# Patient Record
Sex: MALE | Race: WHITE | NOT HISPANIC OR LATINO | Employment: FULL TIME | URBAN - METROPOLITAN AREA
[De-identification: names, ages, dates, MRNs, and addresses within clinical notes are randomized per-mention and may not be internally consistent; named-entity substitution may affect disease eponyms.]

---

## 2018-01-10 NOTE — RESULT NOTES
Message   Please inform patient that the biopsies from his stomach did not show any evidence of H  pylori infection  The small polyps removed from his colon were hyperplastic polyps  His next colonoscopy should be in 10 years for screening  He had a small ulcer which showed some chronic inflammation  However this was not typical of any specific colitis  If he is taking any NSAIDs he should avoid these medications  I would like to see him back in the office in 6 months time to follow-up  He should be taking Zantac twice daily as well  Verified Results  (1) TISSUE EXAM 22Mar2016 10:07AM Nevelyn Holstein     Test Name Result Flag Reference   LAB AP CASE REPORT (Report)     Surgical Pathology Report             Case: [id number]                   Authorizing Provider: Rui Anaya MD      Collected:      03/22/2016 1007        Ordering Location:   [facility]    Received:      03/22/2016 [address] [place] [address] Endoscopy                               Pathologist:      Gregoria Toribio MD                               Specimens:  A) - Stomach, Stomach body bx r/o H pylori                               B) - Large Intestine, Right/Ascending Colon, Ascending colon ulcer bx cold forcept           C) - Large Intestine, Left/Descending Colon, Polyp descending colon cold forcept            D) - Large Intestine, Sigmoid Colon, Sigmoid polyp, cold forcept   LAB AP FINAL DIAGNOSIS (Report)     A  Stomach body biopsy:  - Chronic inactive antral gastritis, negative for curvilinear Helicobacter   pylori, confirmed by immunohistochemical stains, evaluated with   appropriate positive & negative controls  - No atrophy or intestinal metaplasia identified   - No epithelial dysplasia and no evidence of malignancy  B  Ascending colon ulcer:  - Focal active colitis with chronic inflammation & fibrosis of subjacent   fibroadipose tissue, compatible with ulcer bed    - No epithelial dysplasia and no evidence of malignancy  C  Descending colon:  - Hyperplastic polyp   - No epithelial dysplasia and no evidence of malignancy  D  Sigmoid colon:  - Hyperplastic polyp   - No epithelial dysplasia and no evidence of malignancy  [facility]   LAB AP SURGICAL ADDITIONAL INFORMATION (Report)     These tests were developed and their performance characteristics   determined by [address] [place] [address] or [employer]  They may not be cleared or approved by the [place]  Food and   Drug Administration  The FDA has determined that such clearance or   approval is not necessary  These tests are used for clinical purposes  They should not be regarded as investigational or for research  This   laboratory has been approved by [place] [id number], designated as a high-complexity   laboratory and is qualified to perform these tests  LAB AP GROSS DESCRIPTION (Report)     A  The specimen is received in formalin, labeled with the patient's name   and hospital number, and is designated stomach body biopsy rule out H    pylori  The specimen consists of multiple tan red soft tissue fragments   measuring in aggregate 0 6 x 0 5 x 0 1 cm  Entirely submitted  One   cassette  Note: The estimated total formalin fixation time based upon information   provided by the submitting clinician and the standard processing schedule   is 18 5 hours  B  The specimen is received in formalin, labeled with the patient's name   and hospital number, and is designated ascending colon ulcer biopsy  The specimen consists of several tan soft tissue fragments measuring in   aggregate 0 7 x 0 3 x 0 1 cm  Entirely submitted  One cassette  C  The specimen is received in formalin, labeled with the patient's name   and hospital number, and is designated polyp descending colon  The   specimen consists of 3 tan soft tissue fragments each measuring 0 2-0 3   cm   Entirely submitted  One cassette  D  The specimen is received in formalin, labeled with the patient's name   and hospital number, and is designated sigmoid polyp  The specimen   consists of a single tan soft tissue fragment measuring 0 5 cm  Entirely   submitted  One cassette  Note: The estimated total formalin fixation time based upon information   provided by the submitting clinician and the standard processing schedule   is 18 0 hours  MAC         Signatures   Electronically signed by : BARBARA Richards ; Mar 29 2016  4:00PM EST                       (Author)

## 2018-01-15 NOTE — RESULT NOTES
Verified Results  COLONOSCOPY [id number] 11:02AM Paolakasey Spencertles     Test Name Result Flag Reference   Colonoscopy 03/22/16

## 2018-01-16 NOTE — RESULT NOTES
Verified Results  Ultrasound Retroperitoneal [id number] 07:36PM Traci Evans     Test Name Result Flag Reference   U/S Retroperitoneal (Report)     [address];;Vinicio;PA;[id number]   01/05/2016 1940 01/05/2016 2010       RENAL ULTRASOUND     INDICATION- Elevated creatinine     COMPARISON- None  TECHNIQUE-  Ultrasound of the retroperitoneum was performed with a   curvilinear transducer utilizing volumetric sweeps and still imaging   techniques  FINDINGS-     KIDNEYS-   Asymmetric in size, left greater than right, which may be related to a   duplicated left collecting system  Right kidney- 9 8 x 4 8 cm  Normal echogenicity and contour  No suspicious masses detected  No hydronephrosis  No shadowing calculi  No perinephric fluid collections  Left kidney- 13 5 x 5 6 cm  Normal echogenicity and contour  No suspicious masses detected  No hydronephrosis  No shadowing calculi  No perinephric fluid collections  URETERS-   Nonvisualized  BLADDER-    Normally distended  No focal thickening or mass lesions  IMPRESSION-      Asymmetric kidneys, left greater than right, 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  Transcribed on- [id number]     - HAI Triana MD   Reading Radiologist- HAI Triana MD   Electronically Signed- HAI Triana MD   Released Date Time- 01/06/16 0909   ------------------------------------------------------------------------------   [id number]^ROSS I SILVER   00384^ROSS I SILVER     (1) BASIC METABOLIC PROFILE [id number] 12:00AM Traci Evans     Test Name Result Flag Reference   GLUCOSE 102 mg/dL H 65-99   Fasting reference interval   UREA NITROGEN (BUN) 19 mg/dL  7-25   CREATININE 1 37 mg/dL H 0 70-1 33   For patients >52years of age, the reference limit  for Creatinine is approximately 13% higher for people  identified as -American  eGFR NON-AFR   AMERICAN 59 mL/min/1 73m2 L > OR = 60   eGFR AFRICAN AMERICAN 69 mL/min/1 73m2  > OR = 60   BUN/CREATININE RATIO 14 (calc)  6-22   SODIUM 140 mmol/L  135-146   POTASSIUM 4 6 mmol/L  3 5-5 3   CHLORIDE 106 mmol/L     CARBON DIOXIDE 25 mmol/L  19-30   CALCIUM 9 4 mg/dL  8 6-10 3     (Q) TESTOSTERONE, FREE AND TOTAL, LC/MS/MS [id number] 12:00AM Reji Mason     Test Name Result Flag Reference   TESTOSTERONE, TOTAL,$LC/MS/ ng/dL  250-1100   For more information on this test, go to  [email or web address]/  TotalTestosteroneLCMSMS   FREE TESTOSTERONE 61 2 pg/mL  35 0-155 0       Plan  Elevated serum creatinine    · (1) BASIC METABOLIC PROFILE; Status:Active;  Requested for:[id number];

## 2019-08-12 RX ORDER — VALACYCLOVIR HYDROCHLORIDE 500 MG/1
1 TABLET, FILM COATED ORAL 2 TIMES DAILY PRN
COMMUNITY

## 2019-08-13 ENCOUNTER — OFFICE VISIT (OUTPATIENT)
Dept: FAMILY MEDICINE CLINIC | Facility: CLINIC | Age: 54
End: 2019-08-13
Payer: COMMERCIAL

## 2019-08-13 VITALS
HEIGHT: 67 IN | DIASTOLIC BLOOD PRESSURE: 68 MMHG | BODY MASS INDEX: 39.31 KG/M2 | SYSTOLIC BLOOD PRESSURE: 102 MMHG | WEIGHT: 250.5 LBS

## 2019-08-13 DIAGNOSIS — G57.12 MERALGIA PARESTHETICA OF LEFT SIDE: Primary | ICD-10-CM

## 2019-08-13 PROCEDURE — 99214 OFFICE O/P EST MOD 30 MIN: CPT | Performed by: FAMILY MEDICINE

## 2019-08-13 PROCEDURE — 3008F BODY MASS INDEX DOCD: CPT | Performed by: FAMILY MEDICINE

## 2019-08-13 NOTE — PROGRESS NOTES
Assessment/Plan:  Meralgia paresthetica of left side  The patient has what appears to be meralgia paresthetica of his left side  We discussed the importance of weight loss to try to diminish his central obesity as this may improve his symptomatology  We also discussed the role of gabapentin if his discomfort is significantly bothersome  He is going to avoid this presently  We also noted he has not had any blood work recently  We should rule out diabetes X cetera  He will return and will get CBC CMP lipids a TSH as well as a Lyme titer  He is in agreement  Diagnoses and all orders for this visit:    Meralgia paresthetica of left side          Subjective:   Chief Complaint   Patient presents with    Numbness left outter thigh, when overuse burns        Patient ID: Patty Bell is a 53 y o  male  HPI  The patient has some numbness of his left lateral thigh that is been present for several weeks  It began possibly after a trip to play paint ball  He describes it as a burning crawling sensation over his left lateral thigh  It seems to worsen when he stands up and ambulates and seems better when he is sitting or recumbent  He has no weakness of his lower extremity  He has no incontinence of bowel or bladder  He has no other dysesthesias or other focal neurologic symptom  The following portions of the patient's history were reviewed and updated as appropriate: allergies, current medications, past family history, past medical history, past social history, past surgical history and problem list     Review of Systems   Constitution: Positive for weight loss  He has lost about 25 lb through improved dietary pattern   Cardiovascular: Negative  Respiratory: Negative  Endocrine: Negative for polydipsia, polyphagia and polyuria  Hematologic/Lymphatic: Negative for adenopathy and bleeding problem  Does not bruise/bleed easily     Musculoskeletal: Negative for back pain, falls and muscle weakness  Gastrointestinal: Negative for bowel incontinence  Genitourinary: Negative for bladder incontinence  Neurological: Positive for numbness, paresthesias and sensory change  Negative for dizziness and headaches  Psychiatric/Behavioral: Negative  Objective:    Physical Exam   Constitutional: He is oriented to person, place, and time  He appears well-developed and well-nourished  Obese in no distress   Eyes: Right eye exhibits no discharge  Left eye exhibits no discharge  No scleral icterus  Neck: No thyromegaly present  Cardiovascular: Normal rate, regular rhythm and normal heart sounds  Pulmonary/Chest: Effort normal and breath sounds normal    Abdominal: Soft  Bowel sounds are normal  He exhibits no mass  He has a protuberant abdomen   Musculoskeletal: He exhibits no edema  Lymphadenopathy:     He has no cervical adenopathy  Neurological: He is alert and oriented to person, place, and time  He has normal reflexes  He indicates left lateral thigh as the area of sensory change though he can perceive touch  Skin: No rash noted  Psychiatric: He has a normal mood and affect  Thought content normal    Nursing note and vitals reviewed        Wt Readings from Last 7 Encounters:   08/13/19 114 kg (250 lb 8 oz)   03/22/16 117 kg (258 lb 9 6 oz)   03/09/16 119 kg (263 lb)   02/26/16 120 kg (265 lb 4 oz)   02/17/16 120 kg (265 lb)   12/15/15 117 kg (258 lb)   ]

## 2019-08-13 NOTE — ASSESSMENT & PLAN NOTE
The patient has what appears to be meralgia paresthetica of his left side  We discussed the importance of weight loss to try to diminish his central obesity as this may improve his symptomatology  We also discussed the role of gabapentin if his discomfort is significantly bothersome  He is going to avoid this presently  We also noted he has not had any blood work recently  We should rule out diabetes X cetera  He will return and will get CBC CMP lipids a TSH as well as a Lyme titer  He is in agreement

## 2019-08-15 ENCOUNTER — CLINICAL SUPPORT (OUTPATIENT)
Dept: FAMILY MEDICINE CLINIC | Facility: CLINIC | Age: 54
End: 2019-08-15
Payer: COMMERCIAL

## 2019-08-15 DIAGNOSIS — Z13.220 SCREENING FOR LIPID DISORDERS: ICD-10-CM

## 2019-08-15 DIAGNOSIS — R53.83 OTHER FATIGUE: ICD-10-CM

## 2019-08-15 DIAGNOSIS — G57.12 MERALGIA PARESTHETICA OF LEFT SIDE: Primary | ICD-10-CM

## 2019-08-15 PROCEDURE — 36415 COLL VENOUS BLD VENIPUNCTURE: CPT

## 2019-08-16 LAB
ALBUMIN SERPL-MCNC: 4 G/DL (ref 3.6–5.1)
ALBUMIN/GLOB SERPL: 1.8 (CALC) (ref 1–2.5)
ALP SERPL-CCNC: 62 U/L (ref 40–115)
ALT SERPL-CCNC: 18 U/L (ref 9–46)
AST SERPL-CCNC: 15 U/L (ref 10–35)
B BURGDOR AB SER IA-ACNC: <0.9 INDEX
BILIRUB SERPL-MCNC: 0.4 MG/DL (ref 0.2–1.2)
BUN SERPL-MCNC: 23 MG/DL (ref 7–25)
BUN/CREAT SERPL: NORMAL (CALC) (ref 6–22)
CALCIUM SERPL-MCNC: 9.3 MG/DL (ref 8.6–10.3)
CHLORIDE SERPL-SCNC: 109 MMOL/L (ref 98–110)
CHOLEST SERPL-MCNC: 218 MG/DL
CHOLEST/HDLC SERPL: 5.6 (CALC)
CO2 SERPL-SCNC: 21 MMOL/L (ref 20–32)
CREAT SERPL-MCNC: 1.28 MG/DL (ref 0.7–1.33)
ERYTHROCYTE [DISTWIDTH] IN BLOOD BY AUTOMATED COUNT: 13.9 % (ref 11–15)
GLOBULIN SER CALC-MCNC: 2.2 G/DL (CALC) (ref 1.9–3.7)
GLUCOSE SERPL-MCNC: 91 MG/DL (ref 65–99)
HCT VFR BLD AUTO: 42.6 % (ref 38.5–50)
HDLC SERPL-MCNC: 39 MG/DL
HGB BLD-MCNC: 13.6 G/DL (ref 13.2–17.1)
LDLC SERPL CALC-MCNC: 138 MG/DL (CALC)
MCH RBC QN AUTO: 29.1 PG (ref 27–33)
MCHC RBC AUTO-ENTMCNC: 31.9 G/DL (ref 32–36)
MCV RBC AUTO: 91 FL (ref 80–100)
NONHDLC SERPL-MCNC: 179 MG/DL (CALC)
PLATELET # BLD AUTO: 255 THOUSAND/UL (ref 140–400)
PMV BLD REES-ECKER: 10.4 FL (ref 7.5–12.5)
POTASSIUM SERPL-SCNC: 4.8 MMOL/L (ref 3.5–5.3)
PROT SERPL-MCNC: 6.2 G/DL (ref 6.1–8.1)
RBC # BLD AUTO: 4.68 MILLION/UL (ref 4.2–5.8)
SL AMB EGFR AFRICAN AMERICAN: 73 ML/MIN/1.73M2
SL AMB EGFR NON AFRICAN AMERICAN: 63 ML/MIN/1.73M2
SODIUM SERPL-SCNC: 140 MMOL/L (ref 135–146)
TRIGL SERPL-MCNC: 265 MG/DL
TSH SERPL-ACNC: 2.11 MIU/L (ref 0.4–4.5)
WBC # BLD AUTO: 6.8 THOUSAND/UL (ref 3.8–10.8)

## 2023-03-06 ENCOUNTER — NEW PATIENT COMPREHENSIVE (OUTPATIENT)
Dept: URBAN - METROPOLITAN AREA CLINIC 6 | Facility: CLINIC | Age: 58
End: 2023-03-06

## 2023-03-06 DIAGNOSIS — H04.123: ICD-10-CM

## 2023-03-06 DIAGNOSIS — H52.4: ICD-10-CM

## 2023-03-06 PROCEDURE — 92015 DETERMINE REFRACTIVE STATE: CPT

## 2023-03-06 PROCEDURE — 92004 COMPRE OPH EXAM NEW PT 1/>: CPT

## 2023-03-06 ASSESSMENT — VISUAL ACUITY
OS_CC: 20/30+2
OD_CC: 20/30
OD_CC: J1+
OS_CC: J1+

## 2023-03-06 ASSESSMENT — TONOMETRY
OS_IOP_MMHG: 12
OD_IOP_MMHG: 13

## 2023-08-03 ENCOUNTER — OFFICE VISIT (OUTPATIENT)
Dept: FAMILY MEDICINE CLINIC | Facility: CLINIC | Age: 58
End: 2023-08-03
Payer: COMMERCIAL

## 2023-08-03 VITALS
SYSTOLIC BLOOD PRESSURE: 124 MMHG | HEART RATE: 63 BPM | OXYGEN SATURATION: 98 % | BODY MASS INDEX: 41.77 KG/M2 | WEIGHT: 266.13 LBS | DIASTOLIC BLOOD PRESSURE: 57 MMHG | RESPIRATION RATE: 18 BRPM | TEMPERATURE: 98 F | HEIGHT: 67 IN

## 2023-08-03 DIAGNOSIS — G89.29 CHRONIC RIGHT SHOULDER PAIN: ICD-10-CM

## 2023-08-03 DIAGNOSIS — H10.11 ALLERGIC CONJUNCTIVITIS OF RIGHT EYE: ICD-10-CM

## 2023-08-03 DIAGNOSIS — Z00.00 PERIODIC HEALTH ASSESSMENT, GENERAL SCREENING, ADULT: Primary | ICD-10-CM

## 2023-08-03 DIAGNOSIS — R53.83 OTHER FATIGUE: ICD-10-CM

## 2023-08-03 DIAGNOSIS — R35.1 NOCTURIA: ICD-10-CM

## 2023-08-03 DIAGNOSIS — M25.511 CHRONIC RIGHT SHOULDER PAIN: ICD-10-CM

## 2023-08-03 PROCEDURE — 99396 PREV VISIT EST AGE 40-64: CPT | Performed by: FAMILY MEDICINE

## 2023-08-03 PROCEDURE — 99214 OFFICE O/P EST MOD 30 MIN: CPT | Performed by: FAMILY MEDICINE

## 2023-08-03 NOTE — PROGRESS NOTES
FAMILY PRACTICE OFFICE VISIT       NAME: Reza Still  AGE: 60 y.o. SEX: adult       : 1965        MRN: [id number]    DATE: 8/3/2023  TIME: 10:44 AM    Assessment and Plan     Problem List Items Addressed This Visit        Other    Other fatigue     Fatigue. Patient will check blood work as ordered. He will also obtain blood work for testosterone levels. We discussed the possibility of scheduling a home sleep study if blood work is unremarkable         Relevant Orders    Testosterone, free, total    Periodic health assessment, general screening, adult - Primary     Well adult. Overall the patient appears to be in stable health. His colonoscopy is up-to-date until 2016. He will obtain blood work as ordered for further evaluation. We will make further recommendations pending results of test.  He was recommended to initiate a walking regimen with gradual titration up to 30 minutes 3 to 4 days a week         Relevant Orders    CBC    Comprehensive metabolic panel    Lipid panel    TSH, 3rd generation    Chronic right shoulder pain     Shoulder pain. Patient will obtain x-ray of right shoulder and he was given a referral to have consultation with physical therapy. Relevant Orders    XR shoulder 2+ vw right    Ambulatory Referral to Physical Therapy    Allergic conjunctivitis of right eye     Allergic conjunctivitis. Patient with possibility of allergic condition causing drainage of right eye which is intermittent. He will try to be more consistent with using a mask and goggles during long cutting however if symptoms continue we discussed possibility of adding Pataday as a treatment option        Other Visit Diagnoses     Nocturia        Relevant Orders    PSA, Total Screen              Chief Complaint     Chief Complaint   Patient presents with   • Well Check     Physical        History of Present Illness     Patient in the office for annual wellness exam.  He denies any recent illness. He has several issues to discuss. He rarely saw his previous PCP. His last colonoscopy was 2016. He has not had any blood test since 2019. Patient does not drink alcohol but does admit to smoking at least 1 cigar daily. He does not obtain a regular form of aerobic exercise. He has felt increased fatigue and difficulties losing weight as well as decreased libido over the past year. Patient has had several months to year history of intermittent right shoulder pain which becomes worse when he is trying to sleep at night. He denies any prior injuries to his right shoulder. Over the past several weeks patient has noticed tearing of his right eye which he believes may be related to mowing his grass on a riding mower. Patient has not been consistent with wearing a mask and goggles. Symptoms tend to be intermittent and resolve without any specific treatment. He denies any eye pain or significant changes in vision      Review of Systems   Review of Systems   Constitutional: Positive for fatigue. HENT: Negative. Eyes: Positive for discharge. Respiratory: Negative. Cardiovascular: Negative. Gastrointestinal: Negative. Genitourinary:        As per HPI   Musculoskeletal: Positive for arthralgias. Neurological:        Patient with occasional paresthesias of his forearm and humerus area which he feels is related to his right shoulder discomfort   Psychiatric/Behavioral: Negative.         Active Problem List     Patient Active Problem List   Diagnosis   • Asymmetric kidneys, acquired   • Other fatigue   • Meralgia paresthetica of left side   • Periodic health assessment, general screening, adult   • Chronic right shoulder pain   • Allergic conjunctivitis of right eye       Past Medical History:  Past Medical History:   Diagnosis Date   • Dysphagia        Past Surgical History:  Past Surgical History:   Procedure Laterality Date   • PA ESOPHAGOGASTRODUODENOSCOPY TRANSORAL DIAGNOSTIC N/A 3/22/2016 Procedure: EGD AND COLONOSCOPY;  Surgeon: Mika Guadalupe MD;  Location: [facility]; Service: Gastroenterology   • TONSILLECTOMY     • UMBILICAL HERNIA REPAIR         Family History:  Family History   Problem Relation Age of Onset   • Diabetes Mother    • Hypertension Mother    • Cancer Mother    • Pancreatic cancer Family        Social History:  Social History     Socioeconomic History   • Marital status: /Civil Union     Spouse name: Not on file   • Number of children: Not on file   • Years of education: Not on file   • Highest education level: Not on file   Occupational History   • Not on file   Tobacco Use   • Smoking status: Some Days     Types: Cigars   • Smokeless tobacco: Never   Substance and Sexual Activity   • Alcohol use: No   • Drug use: No   • Sexual activity: Yes   Other Topics Concern   • Not on file   Social History Narrative    Former smoker - As per [employer]      Social Determinants of Health     Financial Resource Strain: Not on file   Food Insecurity: Not on file   Transportation Needs: Not on file   Physical Activity: Not on file   Stress: Not on file   Social Connections: Not on file   Intimate Partner Violence: Not on file   Housing Stability: Not on file       Objective     Vitals:    08/03/23 0911   BP: 124/57   Pulse: 63   Resp: 18   Temp: 98 °F (36.7 °C)   SpO2: 98%     Wt Readings from Last 3 Encounters:   08/03/23 121 kg (266 lb 2 oz)   08/13/19 114 kg (250 lb 8 oz)   03/22/16 117 kg (258 lb 9.6 oz)       Physical Exam  Constitutional:       General: Alix Severe is not in acute distress. Appearance: Normal appearance. Alix Severe is obese. Alix Severe is not ill-appearing. HENT:      Head: Normocephalic and atraumatic. Right Ear: Tympanic membrane, ear canal and external ear normal. There is no impacted cerumen. Left Ear: Tympanic membrane, ear canal and external ear normal. There is no impacted cerumen. Eyes:      General:         Right eye: No discharge. Left eye: No discharge. Extraocular Movements: Extraocular movements intact. Conjunctiva/sclera: Conjunctivae normal.      Pupils: Pupils are equal, round, and reactive to light. Neck:      Vascular: No carotid bruit. Cardiovascular:      Rate and Rhythm: Normal rate and regular rhythm. Heart sounds: Normal heart sounds. No murmur heard. Pulmonary:      Effort: Pulmonary effort is normal.      Breath sounds: Normal breath sounds. No wheezing, rhonchi or rales. Abdominal:      General: Abdomen is flat. Bowel sounds are normal. There is no distension. Palpations: Abdomen is soft. Tenderness: There is no abdominal tenderness. There is no guarding or rebound. Musculoskeletal:      Right lower leg: No edema. Left lower leg: No edema. Lymphadenopathy:      Cervical: No cervical adenopathy. Skin:     Findings: No rash. Neurological:      General: No focal deficit present. Mental Status: Zoran Hahn is alert and oriented to person, place, and time. Cranial Nerves: No cranial nerve deficit. Psychiatric:         Mood and Affect: Mood normal.         Behavior: Behavior normal.         Thought Content:  Thought content normal.         Judgment: Judgment normal.         Pertinent Laboratory/Diagnostic Studies:  Lab Results   Component Value Date    BUN 23 08/15/2019    CREATININE 1.28 08/15/2019    CALCIUM 9.3 08/15/2019    K 4.8 08/15/2019    CO2 21 08/15/2019     08/15/2019     Lab Results   Component Value Date    ALT 18 08/15/2019    AST 15 08/15/2019    ALKPHOS 62 08/15/2019       Lab Results   Component Value Date    WBC 6.8 08/15/2019    HGB 13.6 08/15/2019    HCT 42.6 08/15/2019    MCV 91.0 08/15/2019     08/15/2019       No results found for: "TSH"    No results found for: "CHOL"  Lab Results   Component Value Date    TRIG 265 (H) 08/15/2019     Lab Results   Component Value Date    HDL 39 (L) 08/15/2019     Lab Results   Component Value Date [address] 138 (H) 08/15/2019     No results found for: "HGBA1C"    Results for orders placed or performed in visit on 08/15/19   CBC   Result Value Ref Range    White Blood Cell Count 6.8 3.8 - 10.8 Thousand/uL    Red Blood Cell Count 4.68 4.20 - 5.80 Million/uL    Hemoglobin 13.6 13.2 - 17.1 g/dL    HCT 42.6 38.5 - 50.0 %    MCV 91.0 80.0 - 100.0 fL    MCH 29.1 27.0 - 33.0 pg    MCHC 31.9 (L) 32.0 - 36.0 g/dL    RDW 13.9 11.0 - 15.0 %    Platelet Count 652 969 - 400 Thousand/uL    SL AMB MPV 10.4 7.5 - 12.5 fL   Comprehensive metabolic panel   Result Value Ref Range    Glucose, Random 91 65 - 99 mg/dL    BUN 23 7 - 25 mg/dL    Creatinine 1.28 0.70 - 1.33 mg/dL    eGFR Non  63 > OR = 60 mL/min/1.73m2    eGFR African American 73 > OR = 60 mL/min/1.73m2    SL AMB BUN/CREATININE RATIO NOT APPLICABLE 6 - 22 (calc)    Sodium 140 135 - 146 mmol/L    Potassium 4.8 3.5 - 5.3 mmol/L    Chloride 109 98 - 110 mmol/L    CO2 21 20 - 32 mmol/L    Calcium 9.3 8.6 - 10.3 mg/dL    Protein, Total 6.2 6.1 - 8.1 g/dL    Albumin 4.0 3.6 - 5.1 g/dL    Globulin 2.2 1.9 - 3.7 g/dL (calc)    Albumin/Globulin Ratio 1.8 1.0 - 2.5 (calc)    TOTAL BILIRUBIN 0.4 0.2 - 1.2 mg/dL    Alkaline Phosphatase 62 40 - 115 U/L    AST 15 10 - 35 U/L    ALT 18 9 - 46 U/L   Lipid panel   Result Value Ref Range    Total Cholesterol 218 (H) <200 mg/dL    HDL 39 (L) >40 mg/dL    Triglycerides 265 (H) <150 mg/dL    LDL Calculated 138 (H) mg/dL (calc)    Chol HDLC Ratio 5.6 (H) <5.0 (calc)    Non-HDL Cholesterol 179 (H) <130 mg/dL (calc)   TSH, 3rd generation with Free T4 reflex   Result Value Ref Range    TSH W/RFX TO FREE T4 2.11 0.40 - 4.50 mIU/L   Lyme Antibody Profile with reflex to WB   Result Value Ref Range    SL AMB LYME AB SCREEN <0.90 index       Orders Placed This Encounter   Procedures   • XR shoulder 2+ vw right   • CBC   • Comprehensive metabolic panel   • Lipid panel   • TSH, 3rd generation   • PSA, Total Screen   • Testosterone, free, total   • Ambulatory Referral to Physical Therapy       ALLERGIES:  No Known Allergies    Current Medications     Current Outpatient Medications   Medication Sig Dispense Refill   • valACYclovir (VALTREX) 500 mg tablet Take 1 tablet by mouth 2 (two) times a day as needed       No current facility-administered medications for this visit.          Health Maintenance     Health Maintenance   Topic Date Due   • Hepatitis C Screening  Never done   • Pneumococcal Vaccine: Pediatrics (0 to 5 Years) and At-Risk Patients (6 to 59 Years) (1 - PCV) Never done   • HIV Screening  Never done   • BMI: Followup Plan  Never done   • Annual Physical  Never done   • DTaP,Tdap,and Td Vaccines (1 - Tdap) Never done   • Cervical Cancer Screening  Never done   • Breast Cancer Screening: Mammogram  Never done   • Colorectal Cancer Screening  03/22/2021   • COVID-19 Vaccine (2 - Booster for Rissa series) 10/21/2021   • Influenza Vaccine (1) 09/01/2023   • Depression Screening  08/03/2024   • BMI: Adult  08/03/2024   • HIB Vaccine  Aged Out   • IPV Vaccine  Aged Out   • Hepatitis A Vaccine  Aged Out   • Meningococcal ACWY Vaccine  Aged Out   • HPV Vaccine  Aged Out     Immunization History   Administered Date(s) Administered   • COVID-19 J&J (Rissa) vaccine 0.5 mL 08/26/2021   • Influenza Injectable, MDCK, Preservative Free, Quadrivalent, 0.5 mL 18/15/3333       Chris Faith MD

## 2023-08-03 NOTE — ASSESSMENT & PLAN NOTE
Fatigue. Patient will check blood work as ordered. He will also obtain blood work for testosterone levels.   We discussed the possibility of scheduling a home sleep study if blood work is unremarkable

## 2023-08-03 NOTE — ASSESSMENT & PLAN NOTE
Shoulder pain. Patient will obtain x-ray of right shoulder and he was given a referral to have consultation with physical therapy.

## 2023-08-03 NOTE — ASSESSMENT & PLAN NOTE
Well adult. Overall the patient appears to be in stable health. His colonoscopy is up-to-date until 2016. He will obtain blood work as ordered for further evaluation.   We will make further recommendations pending results of test.  He was recommended to initiate a walking regimen with gradual titration up to 30 minutes 3 to 4 days a week

## 2023-08-03 NOTE — ASSESSMENT & PLAN NOTE
Allergic conjunctivitis. Patient with possibility of allergic condition causing drainage of right eye which is intermittent.   He will try to be more consistent with using a mask and goggles during long cutting however if symptoms continue we discussed possibility of adding Pataday as a treatment option

## 2023-08-07 ENCOUNTER — APPOINTMENT (OUTPATIENT)
Dept: LAB | Facility: AMBULARY SURGERY CENTER | Age: 58
End: 2023-08-07
Payer: COMMERCIAL

## 2023-08-07 ENCOUNTER — HOSPITAL ENCOUNTER (OUTPATIENT)
Dept: RADIOLOGY | Facility: HOSPITAL | Age: 58
Discharge: HOME | End: 2023-08-07
Payer: COMMERCIAL

## 2023-08-07 DIAGNOSIS — G89.29 CHRONIC RIGHT SHOULDER PAIN: ICD-10-CM

## 2023-08-07 DIAGNOSIS — Z00.00 PERIODIC HEALTH ASSESSMENT, GENERAL SCREENING, ADULT: ICD-10-CM

## 2023-08-07 DIAGNOSIS — M25.511 CHRONIC RIGHT SHOULDER PAIN: ICD-10-CM

## 2023-08-07 DIAGNOSIS — R35.1 NOCTURIA: ICD-10-CM

## 2023-08-07 DIAGNOSIS — R53.83 OTHER FATIGUE: ICD-10-CM

## 2023-08-07 LAB
ALBUMIN SERPL BCP-MCNC: 3.9 G/DL (ref 3.5–5)
ALP SERPL-CCNC: 61 U/L (ref 46–116)
ALT SERPL W P-5'-P-CCNC: 31 U/L (ref 12–78)
ANION GAP SERPL CALCULATED.3IONS-SCNC: 3 MMOL/L
AST SERPL W P-5'-P-CCNC: 13 U/L (ref 5–45)
BILIRUB SERPL-MCNC: 0.54 MG/DL (ref 0.2–1)
BUN SERPL-MCNC: 17 MG/DL (ref 5–25)
CALCIUM SERPL-MCNC: 9 MG/DL (ref 8.3–10.1)
CHLORIDE SERPL-SCNC: 111 MMOL/L (ref 96–108)
CHOLEST SERPL-MCNC: 235 MG/DL
CO2 SERPL-SCNC: 26 MMOL/L (ref 21–32)
CREAT SERPL-MCNC: 1.38 MG/DL (ref 0.6–1.3)
ERYTHROCYTE [DISTWIDTH] IN BLOOD BY AUTOMATED COUNT: 12.4 % (ref 11.6–15.1)
GLUCOSE P FAST SERPL-MCNC: 101 MG/DL (ref 65–99)
HCT VFR BLD AUTO: 40.5 % (ref 36.5–46.1)
HDLC SERPL-MCNC: 44 MG/DL
HGB BLD-MCNC: 13.5 G/DL (ref 12–15.4)
LDLC SERPL CALC-MCNC: 156 MG/DL (ref 0–100)
MCH RBC QN AUTO: 29.5 PG (ref 26.8–34.3)
MCHC RBC AUTO-ENTMCNC: 33.3 G/DL (ref 31.4–37.4)
MCV RBC AUTO: 89 FL (ref 82–98)
NONHDLC SERPL-MCNC: 191 MG/DL
PLATELET # BLD AUTO: 261 THOUSANDS/UL (ref 149–390)
PMV BLD AUTO: 10.6 FL (ref 8.9–12.7)
POTASSIUM SERPL-SCNC: 4.2 MMOL/L (ref 3.5–5.3)
PROT SERPL-MCNC: 7.1 G/DL (ref 6.4–8.4)
PSA SERPL-MCNC: 1.27 NG/ML (ref 0–4)
RBC # BLD AUTO: 4.57 MILLION/UL (ref 3.88–5.12)
SODIUM SERPL-SCNC: 140 MMOL/L (ref 135–147)
TRIGL SERPL-MCNC: 177 MG/DL
TSH SERPL DL<=0.05 MIU/L-ACNC: 2.27 UIU/ML (ref 0.45–4.5)
WBC # BLD AUTO: 6.29 THOUSAND/UL (ref 4.31–10.16)

## 2023-08-07 PROCEDURE — 36415 COLL VENOUS BLD VENIPUNCTURE: CPT

## 2023-08-07 PROCEDURE — 80053 COMPREHEN METABOLIC PANEL: CPT

## 2023-08-07 PROCEDURE — 73030 X-RAY EXAM OF SHOULDER: CPT

## 2023-08-07 PROCEDURE — 84403 ASSAY OF TOTAL TESTOSTERONE: CPT

## 2023-08-07 PROCEDURE — 85027 COMPLETE CBC AUTOMATED: CPT

## 2023-08-07 PROCEDURE — 80061 LIPID PANEL: CPT

## 2023-08-07 PROCEDURE — 84443 ASSAY THYROID STIM HORMONE: CPT

## 2023-08-07 PROCEDURE — 84402 ASSAY OF FREE TESTOSTERONE: CPT

## 2023-08-07 PROCEDURE — G0103 PSA SCREENING: HCPCS

## 2023-08-09 ENCOUNTER — TELEPHONE (OUTPATIENT)
Dept: FAMILY MEDICINE CLINIC | Facility: CLINIC | Age: 58
End: 2023-08-09

## 2023-08-09 DIAGNOSIS — H10.11 ALLERGIC CONJUNCTIVITIS OF RIGHT EYE: Primary | ICD-10-CM

## 2023-08-09 LAB
TESTOST FREE SERPL-MCNC: 10.3 PG/ML (ref 7.2–24)
TESTOST SERPL-MCNC: 244 NG/DL (ref 264–916)

## 2023-08-10 RX ORDER — OLOPATADINE HYDROCHLORIDE 1 MG/ML
1 SOLUTION/ DROPS OPHTHALMIC 2 TIMES DAILY
Qty: 5 ML | Refills: 2 | Status: SHIPPED | OUTPATIENT
Start: 2023-08-10

## 2023-08-10 NOTE — TELEPHONE ENCOUNTER
Reviewed Dr. Dakotah Cherry note and ordered Pataday eye drops to his pharmacy. He should use 1 drop in the affected eye(s) twice a day.

## 2023-08-16 ENCOUNTER — TELEPHONE (OUTPATIENT)
Dept: FAMILY MEDICINE CLINIC | Facility: CLINIC | Age: 58
End: 2023-08-16

## 2023-08-16 NOTE — TELEPHONE ENCOUNTER
Pt seen 8/3 and prescribed olopatadine for conjunctivitis of R eye. States eye has not gotten any better, is wondering if you would suggest he see an Ophthalmologist and if so have any suggestions of where he should go?

## 2023-08-16 NOTE — TELEPHONE ENCOUNTER
I would recommend any of the ophthalmologist at [facility]. There are several that he could see and he can request first available appointment.   Please provide phone number

## 2023-08-17 ENCOUNTER — PROBLEM (OUTPATIENT)
Dept: URBAN - METROPOLITAN AREA CLINIC 6 | Facility: CLINIC | Age: 58
End: 2023-08-17

## 2023-08-17 DIAGNOSIS — H10.13: ICD-10-CM

## 2023-08-17 DIAGNOSIS — H04.123: ICD-10-CM

## 2023-08-17 PROCEDURE — 92012 INTRM OPH EXAM EST PATIENT: CPT

## 2023-08-17 ASSESSMENT — VISUAL ACUITY
OS_SC: 20/40
OD_SC: 20/40

## 2023-08-17 ASSESSMENT — TONOMETRY
OS_IOP_MMHG: 16
OD_IOP_MMHG: 15

## 2023-08-24 DIAGNOSIS — E29.1 HYPOGONADISM IN MALE: Primary | ICD-10-CM

## 2023-09-14 ENCOUNTER — EVALUATION (OUTPATIENT)
Dept: PHYSICAL THERAPY | Facility: REHABILITATION | Age: 58
End: 2023-09-14
Payer: COMMERCIAL

## 2023-09-14 DIAGNOSIS — G89.29 CHRONIC RIGHT SHOULDER PAIN: ICD-10-CM

## 2023-09-14 DIAGNOSIS — M25.511 CHRONIC RIGHT SHOULDER PAIN: ICD-10-CM

## 2023-09-14 PROCEDURE — 97110 THERAPEUTIC EXERCISES: CPT | Performed by: PHYSICAL THERAPIST

## 2023-09-14 PROCEDURE — 97161 PT EVAL LOW COMPLEX 20 MIN: CPT | Performed by: PHYSICAL THERAPIST

## 2023-09-14 NOTE — PROGRESS NOTES
PT Evaluation     Today's date: 2023  Patient name: Ame Camacho  : 1965  MRN: [id number]  Referring provider: Ruth Garza MD  Dx:   Encounter Diagnosis     ICD-10-CM    1. Chronic right shoulder pain  M25.511 Ambulatory Referral to Physical Therapy    G89.29           Start Time: 1577  Stop Time: 1605  Total time in clinic (min): 35 minutes    Assessment  Assessment details: Problem List:  1) hypomobile C/S  2) radial nerve tension    Ame Camacho is a pleasant 60 y.o. adult who presents with R shoulder/arm pain that bothers him while driving and at night. Raquel Naik has C6 nerve root irritation, hypomobile C/S, and radial nerve tension resulting in the pain he is experiencing, worry over not knowing what's wrong, concern at no signs of improvement and fear of not being able to keep active. No further referral appears necessary at this time based upon examination results. I expect Raquel Naik will improve in 2-4 weeks. Positive prognostic indicators include positive attitude toward recovery and good understanding of diagnosis and treatment plan options. Negative prognostic indicators include chronicity of symptoms and obesity. Giancarlo Cifuentes would benefit from skilled physical therapy to address his limitations and allow him to return to driving and sleeping without pain. Comparable signs:  1) supine lying  2) radial nerve tension testing  Impairments: abnormal or restricted ROM, activity intolerance, impaired physical strength, lacks appropriate home exercise program, pain with function, weight-bearing intolerance and poor posture     Symptom irritability: lowUnderstanding of Dx/Px/POC: good   Prognosis: good    Goals  ST-4 weeks  Patient will be independent with home exercise program.   Patient will be able to manage symptoms independently.   Patient will decrease pain by 25-50%    LTG: by discharge  Patient will improve FOTO to goal  Patient will report minimal (1-2/10) pain with aggravating activities to display improvements in overall functional status  Patient will report improved sleep and driving discomfort     Plan  Patient would benefit from: skilled physical therapy  Planned modality interventions: cryotherapy, thermotherapy: hydrocollator packs and unattended electrical stimulation  Planned therapy interventions: IADL retraining, joint mobilization, manual therapy, massage, ADL training, activity modification, abdominal trunk stabilization, ADL retraining, balance, balance/weight bearing training, neuromuscular re-education, body mechanics training, behavior modification, strengthening, stretching, therapeutic activities, therapeutic exercise, therapeutic training, transfer training, graded exercise, graded motor, home exercise program, graded activity, gait training, functional ROM exercises, patient education, postural training, IASTM, kinesiology taping and flexibility  Frequency: 2x week  Duration in visits: 16  Duration in weeks: 8  Treatment plan discussed with: patient        Subjective Evaluation    History of Present Illness  Mechanism of injury: Eliseo Mckeon is a 60 y.o. adult presenting to physical therapy on 23 with referral from MD for R shoulder pain that has been bothering him for about 6 months. Pain is up in the upper arm and sometimes down into the wrist. Most of his pain is at night or driving are the most aggravating factors. Quality of life: good    Patient Goals  Patient goals for therapy: decreased pain  Patient goal: improved sleep, no pain while driving  Pain  Current pain ratin  At best pain ratin  At worst pain ratin  Location: Upper arm and back of shoulder  Quality: pins and needles.   Relieving factors: change in position  Aggravating factors: sitting (driving, sleeping)  Progression: no change      Diagnostic Tests  X-ray: normal        Objective  Posture: rounded shoulders, forward head   Myotomes all intact b/l  Dermatome: all intact b/l             Reflexes: C5-6: 2+ b/l   C5-6: 2+ b/l     C7: 2+ b/l                 Palpation: TTP over C5-6 TPs, scalenes     Cervical  % of normal   Flex. 100%   Extn. 100%   SB Left 100%   SB Right 100%   ROT Left 100%   ROT Right 100%         MMT         AROM          PROM    Shoulder       L       R        L           R      L     R   Flex. [facility] WFL WFL WFL   Abd. 5 5 WFL WFL WFL WFL   IR. [facility]   ER.    [facility] WFL            Upper Trap         Mid Trap         Low Trap         Serratus              Neuro Dynamic Testing:  Median Nerve: L= (-)  Ulnar Nerve: L= (-)   Radial Nerve: L= (+)           Radiculopathy Testin) Median nerve ANTT: +/-  2) Spurlings Test: (-)  3) Distraction Test: (-)  4) CS Active Rotation >60 degrees: Y  OTHER: Bicep reflex impaired vs. Uninvolved side: N    Thoracic Spine:          Segmental mobility:  Mid CS= hypomobile on R      CTJ= hypomobile    TS= hypomobile       Precautions: standard    Manuals                                                                 Neuro Re-Ed                                                                                                        Ther Ex             Radial nerve glides HEP            Chin tucks HEP                                                                             HEP/education 8'            Ther Activity                                       Gait Training                                       Modalities

## 2023-10-02 PROBLEM — Z00.00 PERIODIC HEALTH ASSESSMENT, GENERAL SCREENING, ADULT: Status: RESOLVED | Noted: 2023-08-03 | Resolved: 2023-10-02

## 2023-10-02 PROBLEM — H10.11 ALLERGIC CONJUNCTIVITIS OF RIGHT EYE: Status: RESOLVED | Noted: 2023-08-03 | Resolved: 2023-10-02

## 2023-10-06 ENCOUNTER — OFFICE VISIT (OUTPATIENT)
Dept: UROLOGY | Facility: CLINIC | Age: 58
End: 2023-10-06
Payer: COMMERCIAL

## 2023-10-06 VITALS
WEIGHT: 260 LBS | OXYGEN SATURATION: 95 % | HEIGHT: 67 IN | BODY MASS INDEX: 40.81 KG/M2 | HEART RATE: 76 BPM | DIASTOLIC BLOOD PRESSURE: 82 MMHG | SYSTOLIC BLOOD PRESSURE: 128 MMHG

## 2023-10-06 DIAGNOSIS — G47.30 SLEEP APNEA, UNSPECIFIED TYPE: Primary | ICD-10-CM

## 2023-10-06 DIAGNOSIS — E29.1 HYPOGONADISM IN MALE: ICD-10-CM

## 2023-10-06 PROCEDURE — 99203 OFFICE O/P NEW LOW 30 MIN: CPT | Performed by: PHYSICIAN ASSISTANT

## 2023-10-06 NOTE — PROGRESS NOTES
UROLOGY PROGRESS NOTE   Patient Identifiers: Zeenat Aguilar (MRN [id number])  Date of Service: 10/6/2023    Subjective:   50-year-old male referred from primary care for evaluation of testosterone deficiency. He complains of muscle loss fatigue lack of energy ED and low libido. Testosterone is 244. Free testosterone 10.3. PSA 1.27. He has never been to a urologist before. He denies any family history of prostate bladder or kidney diseases.     Reason for visit: Testosterone deficiency follow-up    Objective:     VITALS:    Vitals:    10/06/23 1455   BP: 128/82   Pulse: 76   SpO2: 95%     AUA SYMPTOM SCORE    Flowsheet Row Most Recent Value   AUA SYMPTOM SCORE    How often have you had a sensation of not emptying your bladder completely after you finished urinating? 0 (P)     How often have you had to urinate again less than two hours after you finished urinating? 1 (P)     How often have you found you stopped and started again several times when you urinate? 0 (P)     How often have you found it difficult to postpone urination? 1 (P)     How often have you had a weak urinary stream? 3 (P)     How often have you had to push or strain to begin urination? 1 (P)     How many times did you most typically get up to urinate from the time you went to bed at night until the time you got up in the morning? 1 (P)     Quality of Life: If you were to spend the rest of your life with your urinary condition just the way it is now, how would you feel about that? 2 (P)     AUA SYMPTOM SCORE 7 (P)             LABS:  Lab Results   Component Value Date    HGB 13.5 08/07/2023    HCT 40.5 08/07/2023    WBC 6.29 08/07/2023     08/07/2023   ]    Lab Results   Component Value Date    K 4.2 08/07/2023     (H) 08/07/2023    CO2 26 08/07/2023    BUN 17 08/07/2023    CREATININE 1.38 (H) 08/07/2023    CALCIUM 9.0 08/07/2023   ]        INPATIENT MEDS:    Current Outpatient Medications:   •  clomiPHENE (CLOMID) 50 mg tablet, Take 1 tablet (50 mg total) by mouth every other day, Disp: 30 tablet, Rfl: 6  •  olopatadine (PATANOL) 0.1 % ophthalmic solution, Administer 1 drop to both eyes 2 (two) times a day, Disp: 5 mL, Rfl: 2  •  valACYclovir (VALTREX) 500 mg tablet, Take 1 tablet by mouth 2 (two) times a day as needed, Disp: , Rfl:       Physical Exam:   /82 (BP Location: Left arm, Patient Position: Sitting, Cuff Size: Large)   Pulse 76   Ht 5' 7" (1.702 m)   Wt 118 kg (260 lb)   SpO2 95%   BMI 40.72 kg/m²   GEN: no acute distress    RESP: breathing comfortably with no accessory muscle use    ABD: soft, non-tender, non-distended     EXT: no significant peripheral edema   (Male): Penis uncircumcised, phallus normal, meatus patent. Testicles descended into scrotum bilaterally without masses nor tenderness. No inguinal hernias bilaterally. ROSELYN: Prostate is enlarged at 35 grams. The prostate is not boggy. The prostate is not tender. No nodules noted      RADIOLOGY:   none     Assessment:   #1. Testosterone deficiency  #2. BPH  #3. Erectile dysfunction  #4. Obesity  #5.   Obstructive sleep apnea    Plan:   -I discussed the options of management with the patient and his wife  -I started him on Clomid 50 mg every other day  -We will check his labs in about 3 months  -I ordered a sleep medicine consultation and sleep apnea test and explained that sleep apnea could contribute to all of his symptoms

## 2023-12-08 DIAGNOSIS — H10.11 ALLERGIC CONJUNCTIVITIS OF RIGHT EYE: ICD-10-CM

## 2023-12-08 RX ORDER — OLOPATADINE HYDROCHLORIDE 1 MG/ML
SOLUTION/ DROPS OPHTHALMIC
Qty: 5 ML | Refills: 2 | Status: SHIPPED | OUTPATIENT
Start: 2023-12-08

## 2024-01-02 DIAGNOSIS — H10.11 ALLERGIC CONJUNCTIVITIS OF RIGHT EYE: ICD-10-CM

## 2024-01-02 RX ORDER — OLOPATADINE HYDROCHLORIDE OPHTHALMIC 1 MG/ML
SOLUTION/ DROPS OPHTHALMIC
Qty: 15 ML | Refills: 1 | Status: SHIPPED | OUTPATIENT
Start: 2024-01-02

## 2024-01-04 ENCOUNTER — TELEPHONE (OUTPATIENT)
Age: 59
End: 2024-01-04

## 2024-01-04 NOTE — TELEPHONE ENCOUNTER
Pt called to confirm up coming appointment and check if he needs blood work which I also confirmed.

## 2024-01-08 ENCOUNTER — APPOINTMENT (OUTPATIENT)
Dept: LAB | Facility: AMBULARY SURGERY CENTER | Age: 59
End: 2024-01-08
Payer: COMMERCIAL

## 2024-01-08 DIAGNOSIS — E29.1 HYPOGONADISM IN MALE: ICD-10-CM

## 2024-01-08 LAB
ERYTHROCYTE [DISTWIDTH] IN BLOOD BY AUTOMATED COUNT: 13.2 % (ref 11.6–15.1)
FSH SERPL-ACNC: 14.7 MIU/ML
HCT VFR BLD AUTO: 43.3 % (ref 36.5–49.3)
HGB BLD-MCNC: 13.8 G/DL (ref 12–17)
LH SERPL-ACNC: 33.7 MIU/ML
MCH RBC QN AUTO: 29.6 PG (ref 26.8–34.3)
MCHC RBC AUTO-ENTMCNC: 31.9 G/DL (ref 31.4–37.4)
MCV RBC AUTO: 93 FL (ref 82–98)
PLATELET # BLD AUTO: 243 THOUSANDS/UL (ref 149–390)
PMV BLD AUTO: 11.3 FL (ref 8.9–12.7)
PROLACTIN SERPL-MCNC: 13.54 NG/ML
RBC # BLD AUTO: 4.66 MILLION/UL (ref 3.88–5.62)
WBC # BLD AUTO: 7.34 THOUSAND/UL (ref 4.31–10.16)

## 2024-01-08 PROCEDURE — 84402 ASSAY OF FREE TESTOSTERONE: CPT

## 2024-01-08 PROCEDURE — 83002 ASSAY OF GONADOTROPIN (LH): CPT

## 2024-01-08 PROCEDURE — 84403 ASSAY OF TOTAL TESTOSTERONE: CPT

## 2024-01-08 PROCEDURE — 83001 ASSAY OF GONADOTROPIN (FSH): CPT

## 2024-01-08 PROCEDURE — 84146 ASSAY OF PROLACTIN: CPT

## 2024-01-08 PROCEDURE — 85027 COMPLETE CBC AUTOMATED: CPT

## 2024-01-08 PROCEDURE — 36415 COLL VENOUS BLD VENIPUNCTURE: CPT

## 2024-01-09 LAB
TESTOST FREE SERPL-MCNC: 19.9 PG/ML (ref 7.2–24)
TESTOST SERPL-MCNC: 616 NG/DL (ref 264–916)

## 2024-01-10 ENCOUNTER — OFFICE VISIT (OUTPATIENT)
Dept: UROLOGY | Facility: CLINIC | Age: 59
End: 2024-01-10
Payer: COMMERCIAL

## 2024-01-10 VITALS
HEIGHT: 67 IN | SYSTOLIC BLOOD PRESSURE: 128 MMHG | DIASTOLIC BLOOD PRESSURE: 82 MMHG | WEIGHT: 247.4 LBS | HEART RATE: 65 BPM | OXYGEN SATURATION: 96 % | BODY MASS INDEX: 38.83 KG/M2

## 2024-01-10 DIAGNOSIS — E34.9 TESTOSTERONE DEFICIENCY: Primary | ICD-10-CM

## 2024-01-10 PROCEDURE — 99213 OFFICE O/P EST LOW 20 MIN: CPT | Performed by: PHYSICIAN ASSISTANT

## 2024-01-10 NOTE — PROGRESS NOTES
UROLOGY PROGRESS NOTE   Patient Identifiers: Franco Hogan (MRN [id number])  Date of Service: 1/10/2024    Subjective:   58-year-old man history of testosterone deficiency.  He complains of muscle loss fatigue and low energy and libido.  His testosterone was 244.  His PSA 1.27.  I started him on Clomid 50 mg every other day.  Testosterone now 616.  H&H 13.8 and 43.3.    Reason for visit: Testosterone deficiency follow-up    Objective:     VITALS:    There were no vitals filed for this visit.  AUA SYMPTOM SCORE      Flowsheet Row Most Recent Value   AUA SYMPTOM SCORE    How often have you had a sensation of not emptying your bladder completely after you finished urinating? 1 (P)    How often have you had to urinate again less than two hours after you finished urinating? 1 (P)    How often have you found you stopped and started again several times when you urinate? 1 (P)    How often have you found it difficult to postpone urination? 0 (P)    How often have you had a weak urinary stream? 3 (P)    How often have you had to push or strain to begin urination? 1 (P)    How many times did you most typically get up to urinate from the time you went to bed at night until the time you got up in the morning? 1 (P)    Quality of Life: If you were to spend the rest of your life with your urinary condition just the way it is now, how would you feel about that? 2 (P)    AUA SYMPTOM SCORE 8 (P)               LABS:  Lab Results   Component Value Date    HGB 13.8 01/08/2024    HCT 43.3 01/08/2024    WBC 7.34 01/08/2024     01/08/2024   ]    Lab Results   Component Value Date    K 4.2 08/07/2023     (H) 08/07/2023    CO2 26 08/07/2023    BUN 17 08/07/2023    CREATININE 1.38 (H) 08/07/2023    CALCIUM 9.0 08/07/2023   ]        INPATIENT MEDS:    Current Outpatient Medications:     clomiPHENE (CLOMID) 50 mg tablet, Take 1 tablet (50 mg total) by mouth every other day, Disp: 30 tablet, Rfl: 6    [facility] Olopatadine HCl 0.1 %  ophthalmic solution, INSTILL 1 DROP INTO BOTH EYES TWICE A DAY, Disp: 15 mL, Rfl: 1    valACYclovir (VALTREX) 500 mg tablet, Take 1 tablet by mouth 2 (two) times a day as needed, Disp: , Rfl:       Physical Exam:   There were no vitals taken for this visit.  GEN: no acute distress    RESP: breathing comfortably with no accessory muscle use    ABD: soft, non-tender, non-distended   INCISION:    EXT: no significant peripheral edema         RADIOLOGY:   none     Assessment:   #1.  BPH  #2.  Male hypogonadism    Plan:   -Virtual appointment in 6 months with labs prior to visit  -  -  -

## 2024-01-24 ENCOUNTER — TELEMEDICINE (OUTPATIENT)
Dept: FAMILY MEDICINE CLINIC | Facility: CLINIC | Age: 59
End: 2024-01-24
Payer: COMMERCIAL

## 2024-01-24 VITALS — HEIGHT: 67 IN | BODY MASS INDEX: 38.77 KG/M2 | WEIGHT: 247 LBS

## 2024-01-24 DIAGNOSIS — U07.1 COVID-19: Primary | ICD-10-CM

## 2024-01-24 PROCEDURE — 99213 OFFICE O/P EST LOW 20 MIN: CPT | Performed by: FAMILY MEDICINE

## 2024-01-24 RX ORDER — NIRMATRELVIR AND RITONAVIR 150-100 MG
2 KIT ORAL 2 TIMES DAILY
Qty: 20 TABLET | Refills: 0 | Status: SHIPPED | OUTPATIENT
Start: 2024-01-24 | End: 2024-01-24

## 2024-01-24 NOTE — ASSESSMENT & PLAN NOTE
Symptom onset 1/17/24  Tested positive 1/23/24  Patient is vaccinated  Paxlovid is not indicated based on timeframe of illness and resolving symptoms  Symptom directed treatment and supportive care   Isolation precautions discussed  Follow up as needed

## 2024-01-24 NOTE — PROGRESS NOTES
COVID-19 Outpatient Progress Note    Assessment/Plan:    Problem List Items Addressed This Visit     COVID-19 - Primary     Symptom onset 1/17/24  Tested positive 1/23/24  Patient is vaccinated  Paxlovid is not indicated based on timeframe of illness and resolving symptoms  Symptom directed treatment and supportive care   Isolation precautions discussed  Follow up as needed             Disposition:     I have spent a total time of 15 minutes on the day of the encounter for this patient including       Encounter provider: Fazal Mckeon DO     Provider located at: [address] [place] [phone]     Recent Visits  No visits were found meeting these conditions.  Showing recent visits within past 7 days and meeting all other requirements  Today's Visits  Date Type Provider Dept   01/24/24 Telemedicine Fazal Mckeon DO [facility]   Showing today's visits and meeting all other requirements  Future Appointments  No visits were found meeting these conditions.  Showing future appointments within next 150 days and meeting all other requirements     This virtual check-in was done via [employer] and patient was informed that this is a secure, HIPAA-compliant platform. He agrees to proceed.    Patient agrees to participate in a virtual check in via telephone or video visit instead of presenting to the office to address urgent/immediate medical needs. Patient is aware this is a billable service. He acknowledged consent and understanding of privacy and security of the video platform. The patient has agreed to participate and understands they can discontinue the visit at any time.    After connecting through [email or web address], the patient was identified by name and date of birth. Franco Hogan was informed that this was a telemedicine visit and that the exam was being conducted confidentially over secure lines. My office door was closed. No one else was in the room.  "Franco Hogan acknowledged consent and understanding of privacy and security of the telemedicine visit. I informed the patient that I have reviewed his record in Epic and presented the opportunity for him to ask any questions regarding the visit today. The patient agreed to participate.     Verification of patient location:  Patient is located in the following state in which I hold an active license: [place]    Subjective:   Franco Hogna is a 59 y.o. male who has been screened for COVID-19. Symptom change since last report: resolving. Patient's symptoms include nasal congestion, rhinorrhea, sore throat and cough. Patient denies fever, chills, fatigue, malaise, shortness of breath, chest tightness, abdominal pain, nausea, vomiting, diarrhea, myalgias and headaches.     - Date of symptom onset: 1/17/2024  - Date of positive COVID-19 test: 1/23/2024. Type of test: Home antigen.     COVID-19 vaccination status: Fully vaccinated (primary series)    No results found for: \"SARSCOV2\", \"ASEXKHB6NTL\", \"SARSCORONAVI\", \"CORONAVIRUSR\", \"SARSCOVAG\", \"SARSCOVAGH\"    Review of Systems   Constitutional:  Negative for chills, fatigue and fever.   HENT:  Positive for congestion, rhinorrhea and sore throat.    Respiratory:  Positive for cough. Negative for chest tightness and shortness of breath.    Gastrointestinal:  Negative for abdominal pain, diarrhea, nausea and vomiting.   Musculoskeletal:  Negative for myalgias.   Neurological:  Negative for headaches.     Current Outpatient Medications on File Prior to Visit   Medication Sig   • clomiPHENE (CLOMID) 50 mg tablet Take 1 tablet (50 mg total) by mouth every other day   • [facility] Olopatadine HCl 0.1 % ophthalmic solution INSTILL 1 DROP INTO BOTH EYES TWICE A DAY   • valACYclovir (VALTREX) 500 mg tablet Take 1 tablet by mouth 2 (two) times a day as needed       Objective:    Ht 5' 7\" (1.702 m)   Wt 112 kg (247 lb)   BMI 38.69 kg/m²        Physical Exam  It was my intent to perform " this visit via video technology but the patient was not able to do a video connection so the visit was completed via audio telephone only.    Fazal Mckeon, DO

## 2024-04-02 ENCOUNTER — TELEPHONE (OUTPATIENT)
Age: 59
End: 2024-04-02

## 2024-04-02 NOTE — TELEPHONE ENCOUNTER
PA for CLOMID    Submitted via    []CMM-KEY   []S[employer]pletPlus-Case ID #     [x]Faxed to plan/COMPLETED FORM IN MEDIA     []Other website   []Phone call Case ID #     Office notes sent, clinical questions answered. Awaiting determination    Turnaround time for your insurance to make a decision on your Prior Authorization can take 7-21 business days.

## 2024-04-02 NOTE — TELEPHONE ENCOUNTER
Stephanie from [employer] [employer] called stating she needs prior authorization for medication Clomid.  They need clinical information. Diagnosis, the direction for use and any previous medication used. If they don't hear back it will be auto denied by 04/04/24. A fax was sent to the office with the request.       Call back [phone]   Faxed to [phone]  Ref# [id number]

## 2024-04-02 NOTE — TELEPHONE ENCOUNTER
Tara from [facility] calling in regards to pt requesting a prior auth for his medication Clomid. Prior auth is needed.

## 2024-04-03 NOTE — TELEPHONE ENCOUNTER
PA for CLOMID Denied    Reason:(Screenshot if applicable)                        Message sent to office clinical pool Yes    Denial letter scanned into Media Yes

## 2024-04-03 NOTE — TELEPHONE ENCOUNTER
Patient was calling in for an update on the Clomid prescription auth to his insurance. Informed him that the form was filled out yesterday. And today a Denial letter was received from his insurance. Patient is going to start the appeal process on his end.

## 2024-07-10 ENCOUNTER — APPOINTMENT (OUTPATIENT)
Dept: LAB | Facility: AMBULARY SURGERY CENTER | Age: 59
End: 2024-07-10
Payer: COMMERCIAL

## 2024-07-10 DIAGNOSIS — E34.9 TESTOSTERONE DEFICIENCY: ICD-10-CM

## 2024-07-10 LAB
ERYTHROCYTE [DISTWIDTH] IN BLOOD BY AUTOMATED COUNT: 13.1 % (ref 11.6–15.1)
HCT VFR BLD AUTO: 41.7 % (ref 36.5–49.3)
HGB BLD-MCNC: 13.4 G/DL (ref 12–17)
MCH RBC QN AUTO: 29.5 PG (ref 26.8–34.3)
MCHC RBC AUTO-ENTMCNC: 32.1 G/DL (ref 31.4–37.4)
MCV RBC AUTO: 92 FL (ref 82–98)
PLATELET # BLD AUTO: 223 THOUSANDS/UL (ref 149–390)
PMV BLD AUTO: 10.8 FL (ref 8.9–12.7)
RBC # BLD AUTO: 4.55 MILLION/UL (ref 3.88–5.62)
WBC # BLD AUTO: 7.17 THOUSAND/UL (ref 4.31–10.16)

## 2024-07-10 PROCEDURE — 85027 COMPLETE CBC AUTOMATED: CPT

## 2024-07-10 PROCEDURE — 84402 ASSAY OF FREE TESTOSTERONE: CPT

## 2024-07-10 PROCEDURE — 36415 COLL VENOUS BLD VENIPUNCTURE: CPT

## 2024-07-10 PROCEDURE — 84403 ASSAY OF TOTAL TESTOSTERONE: CPT

## 2024-07-11 LAB
TESTOST FREE SERPL-MCNC: 14.9 PG/ML (ref 6.6–18.1)
TESTOST SERPL-MCNC: 712 NG/DL (ref 264–916)

## 2024-07-12 ENCOUNTER — TELEPHONE (OUTPATIENT)
Age: 59
End: 2024-07-12

## 2024-07-12 NOTE — TELEPHONE ENCOUNTER
"Patient returned missed call regarding his insurance information.    Pt states that his Insurance is [employer] and his ID # is either [id number] or the [id number].  Group number was also verified. Pt states that he does not have the physical cards, but will can provide a virtual card at the time of check in.    I tried to run the insurance under both possible ID numbers which resulted in \"contact payor\".    Call back if needed [phone]  "

## 2024-07-16 ENCOUNTER — TELEMEDICINE (OUTPATIENT)
Dept: UROLOGY | Facility: CLINIC | Age: 59
End: 2024-07-16
Payer: COMMERCIAL

## 2024-07-16 ENCOUNTER — TELEPHONE (OUTPATIENT)
Dept: UROLOGY | Facility: CLINIC | Age: 59
End: 2024-07-16

## 2024-07-16 DIAGNOSIS — R39.12 BENIGN PROSTATIC HYPERPLASIA WITH WEAK URINARY STREAM: ICD-10-CM

## 2024-07-16 DIAGNOSIS — N40.1 BENIGN PROSTATIC HYPERPLASIA WITH WEAK URINARY STREAM: ICD-10-CM

## 2024-07-16 DIAGNOSIS — N52.8 OTHER MALE ERECTILE DYSFUNCTION: ICD-10-CM

## 2024-07-16 DIAGNOSIS — E29.1 MALE HYPOGONADISM: Primary | ICD-10-CM

## 2024-07-16 PROCEDURE — 99213 OFFICE O/P EST LOW 20 MIN: CPT | Performed by: PHYSICIAN ASSISTANT

## 2024-07-16 RX ORDER — TADALAFIL 20 MG/1
20 TABLET ORAL DAILY PRN
Qty: 10 TABLET | Refills: 3 | Status: SHIPPED | OUTPATIENT
Start: 2024-07-16

## 2024-07-16 RX ORDER — SILDENAFIL 100 MG/1
100 TABLET, FILM COATED ORAL DAILY PRN
Qty: 10 TABLET | Refills: 3 | Status: SHIPPED | OUTPATIENT
Start: 2024-07-16

## 2024-07-16 NOTE — TELEPHONE ENCOUNTER
I called and LVM for patient to call and schedule with Cortez in 6 months. Please watch for scheduling.           Return in about 6 months (around 1/16/2025) for in person labs prior.

## 2024-07-16 NOTE — PROGRESS NOTES
Virtual Regular Visit  Name: Franco Hogan      : 1965      MRN: [id number]  Encounter Provider: Nishant Grullon PA-C  Encounter Date: 2024   Encounter department: [facility] [place]    Verification of patient location:    Patient is located at Home in the following state in which I hold an active license [place]    Assessment & Plan   1. Male hypogonadism  -     Testosterone, free, total; Future  -     CBC; Future  -     Estrogens, total; Future  2. Benign prostatic hyperplasia with weak urinary stream  -     PSA Total, Diagnostic; Future  -Follow-up in person in 6 months with labs prior to visit      Encounter provider Nishant Grullon PA-C    The patient was identified by name and date of birth. Franco Hogan was informed that this is a telemedicine visit and that the visit is being conducted through the Epic Embedded platform. He agrees to proceed..  My office door was closed. No one else was in the room.  He acknowledged consent and understanding of privacy and security of the video platform. The patient has agreed to participate and understands they can discontinue the visit at any time.    Patient is aware this is a billable service.     History of Present Illness     Franco Hogan is a 59 y.o. male who presents history of male hypogonadism.  He has been doing well on Clomid 50 mg every other day.  Labs been stable and he is satisfied with the results.  No side effects.    Review of Systems    Objective     There were no vitals taken for this visit.  Physical Exam    Visit Time  Total Visit Duration: 15

## 2024-11-07 NOTE — TELEPHONE ENCOUNTER
Patient called and stated that he was here on [birth year]62 and Dr Yg Almazan looked at his eye and stated that if its not any better then to call and he will send something to his pharmacy. Patient is stating that it is not any better and would like to know if something can be sent to [facility] in [facility].  Please call to advise Pharmacy requested refills that are already active on file. Refused request to pharmacy.

## 2024-11-23 DIAGNOSIS — E29.1 HYPOGONADISM IN MALE: ICD-10-CM

## 2024-11-25 RX ORDER — TRIAMCINOLONE ACETONIDE 1 MG/G
CREAM TOPICAL
Qty: 30 TABLET | Refills: 6 | Status: SHIPPED | OUTPATIENT
Start: 2024-11-25

## 2024-12-12 DIAGNOSIS — Z00.6 ENCOUNTER FOR EXAMINATION FOR NORMAL COMPARISON OR CONTROL IN CLINICAL RESEARCH PROGRAM: ICD-10-CM

## 2024-12-23 ENCOUNTER — APPOINTMENT (OUTPATIENT)
Dept: LAB | Facility: AMBULARY SURGERY CENTER | Age: 59
End: 2024-12-23

## 2024-12-23 DIAGNOSIS — Z00.6 ENCOUNTER FOR EXAMINATION FOR NORMAL COMPARISON OR CONTROL IN CLINICAL RESEARCH PROGRAM: ICD-10-CM

## 2024-12-23 DIAGNOSIS — N40.1 BENIGN PROSTATIC HYPERPLASIA WITH WEAK URINARY STREAM: ICD-10-CM

## 2024-12-23 DIAGNOSIS — R39.12 BENIGN PROSTATIC HYPERPLASIA WITH WEAK URINARY STREAM: ICD-10-CM

## 2024-12-23 DIAGNOSIS — E29.1 MALE HYPOGONADISM: ICD-10-CM

## 2024-12-23 PROCEDURE — 36415 COLL VENOUS BLD VENIPUNCTURE: CPT

## 2025-01-03 LAB
APOB+LDLR+PCSK9 GENE MUT ANL BLD/T: NOT DETECTED
BRCA1+BRCA2 DEL+DUP + FULL MUT ANL BLD/T: NOT DETECTED
MLH1+MSH2+MSH6+PMS2 GN DEL+DUP+FUL M: NOT DETECTED

## 2025-01-23 RX ORDER — VALACYCLOVIR HYDROCHLORIDE 500 MG/1
500 TABLET, FILM COATED ORAL 2 TIMES DAILY PRN
Refills: 0 | OUTPATIENT
Start: 2025-01-23

## 2025-02-14 ENCOUNTER — TELEPHONE (OUTPATIENT)
Dept: FAMILY MEDICINE CLINIC | Facility: CLINIC | Age: 60
End: 2025-02-14

## 2025-02-14 DIAGNOSIS — B00.9 HERPES SIMPLEX: Primary | ICD-10-CM

## 2025-02-14 RX ORDER — VALACYCLOVIR HYDROCHLORIDE 500 MG/1
500 TABLET, FILM COATED ORAL 2 TIMES DAILY PRN
Refills: 0 | Status: CANCELLED | OUTPATIENT
Start: 2025-02-14

## 2025-02-14 RX ORDER — VALACYCLOVIR HYDROCHLORIDE 500 MG/1
500 TABLET, FILM COATED ORAL 2 TIMES DAILY PRN
Qty: 10 TABLET | Refills: 5 | Status: SHIPPED | OUTPATIENT
Start: 2025-02-14 | End: 2025-02-19

## 2025-02-14 NOTE — TELEPHONE ENCOUNTER
Reason for call:   [x] Refill   [] Prior Auth  [x] Other: Pt has a cold sore and is out of medication     Office:   [x] PCP/Provider -   [] Specialty/Provider -     Medication: valACYclovir (VALTREX) 500 mg Take 1 tablet by mouth 2 (two) times a day as needed       Pharmacy: [facility]/pharmacy #[id number] - [place], [place]     Does the patient have enough for 3 days?   [] Yes   [x] No - Send as HP to POD

## 2025-03-14 ENCOUNTER — OFFICE VISIT (OUTPATIENT)
Dept: FAMILY MEDICINE CLINIC | Facility: CLINIC | Age: 60
End: 2025-03-14
Payer: COMMERCIAL

## 2025-03-14 ENCOUNTER — APPOINTMENT (OUTPATIENT)
Dept: LAB | Facility: CLINIC | Age: 60
End: 2025-03-14
Payer: COMMERCIAL

## 2025-03-14 VITALS
HEIGHT: 67 IN | HEART RATE: 70 BPM | TEMPERATURE: 97.8 F | SYSTOLIC BLOOD PRESSURE: 132 MMHG | WEIGHT: 244 LBS | BODY MASS INDEX: 38.3 KG/M2 | DIASTOLIC BLOOD PRESSURE: 70 MMHG | OXYGEN SATURATION: 98 % | RESPIRATION RATE: 18 BRPM

## 2025-03-14 DIAGNOSIS — M79.671 RIGHT FOOT PAIN: Primary | ICD-10-CM

## 2025-03-14 LAB
ERYTHROCYTE [DISTWIDTH] IN BLOOD BY AUTOMATED COUNT: 12.6 % (ref 11.6–15.1)
HCT VFR BLD AUTO: 41.2 % (ref 36.5–49.3)
HGB BLD-MCNC: 13.6 G/DL (ref 12–17)
MCH RBC QN AUTO: 29.9 PG (ref 26.8–34.3)
MCHC RBC AUTO-ENTMCNC: 33 G/DL (ref 31.4–37.4)
MCV RBC AUTO: 91 FL (ref 82–98)
PLATELET # BLD AUTO: 231 THOUSANDS/UL (ref 149–390)
PMV BLD AUTO: 10.9 FL (ref 8.9–12.7)
PSA SERPL-MCNC: 3.18 NG/ML (ref 0–4)
RBC # BLD AUTO: 4.55 MILLION/UL (ref 3.88–5.62)
WBC # BLD AUTO: 8.11 THOUSAND/UL (ref 4.31–10.16)

## 2025-03-14 PROCEDURE — 99213 OFFICE O/P EST LOW 20 MIN: CPT | Performed by: FAMILY MEDICINE

## 2025-03-14 RX ORDER — PREDNISONE 20 MG/1
TABLET ORAL
Qty: 11 TABLET | Refills: 0 | Status: SHIPPED | OUTPATIENT
Start: 2025-03-14

## 2025-03-14 NOTE — PROGRESS NOTES
FAMILY PRACTICE OFFICE VISIT       NAME: Franco Hogan  AGE: 60 y.o. SEX: male       : 1965        MRN: [id number]    DATE: 3/14/2025  TIME: 10:23 AM    Assessment and Plan     Problem List Items Addressed This Visit       Right foot pain - Primary    Foot pain.  Patient with possible tendinitis of toe.  He was given prescription for prednisone tapering dose consisting of 40 mg x 3 days, 20 mg x 3 days, 10 mg x 4 days.  If symptoms persist after medication completed he will obtain x-ray of right foot for further evaluation.         Relevant Medications    predniSONE 20 mg tablet    Other Relevant Orders    XR foot 3+ vw right           Chief Complaint     Chief Complaint   Patient presents with    Toe Pain     Right Great-began in right knee then migrated to Great Toe/no more knee pain    Care Gap Colonoscopy     Discuss with pt vs Cologuard       History of Present Illness     Patient in the office with 1 week history of right toe pain.  He denies any acute injury or trauma.  He has never had similar symptoms previously.  He feels discomfort with walking and issues more so on the dorsal surface of his toe.  He denies any swelling or redness along toe or MTP joint    Toe Pain         Review of Systems   Review of Systems   Constitutional: Negative.    Musculoskeletal:  Positive for arthralgias and gait problem.       Active Problem List     Patient Active Problem List   Diagnosis    Asymmetric kidneys, acquired    Other fatigue    Meralgia paresthetica of left side    Chronic right shoulder pain    COVID-19    Right foot pain       Past Medical History:  Past Medical History:   Diagnosis Date    Dysphagia        Past Surgical History:  Past Surgical History:   Procedure Laterality Date    CT ESOPHAGOGASTRODUODENOSCOPY TRANSORAL DIAGNOSTIC N/A 3/22/2016    Procedure: EGD AND COLONOSCOPY;  Surgeon: Froilan Garcia MD;  Location: [facility];  Service: Gastroenterology    TONSILLECTOMY      UMBILICAL HERNIA  REPAIR         Family History:  Family History   Problem Relation Age of Onset    Diabetes Mother     Hypertension Mother     Cancer Mother     Pancreatic cancer Family        Social History:  Social History     Socioeconomic History    Marital status: /Civil Union     Spouse name: Not on file    Number of children: Not on file    Years of education: Not on file    Highest education level: Not on file   Occupational History    Not on file   Tobacco Use    Smoking status: Former     Types: Cigars, Cigarettes    Smokeless tobacco: Never   Vaping Use    Vaping status: Never Used   Substance and Sexual Activity    Alcohol use: Not Currently    Drug use: Never    Sexual activity: Yes   Other Topics Concern    Not on file   Social History Narrative    Former smoker - As per [employer]      Social Drivers of Health     Financial Resource Strain: Not on file   Food Insecurity: Not on file   Transportation Needs: Not on file   Physical Activity: Not on file   Stress: Not on file   Social Connections: Not on file   Intimate Partner Violence: Not on file   Housing Stability: Not on file       Objective     Vitals:    03/14/25 0845   BP: 132/70   Pulse: 70   Resp: 18   Temp: 97.8 °F (36.6 °C)   SpO2: 98%     Wt Readings from Last 3 Encounters:   03/14/25 111 kg (244 lb)   01/24/24 112 kg (247 lb)   01/10/24 112 kg (247 lb 6.4 oz)       Physical Exam  Constitutional:       Appearance: Normal appearance.   Musculoskeletal:         General: Tenderness present. No swelling, deformity or signs of injury.      Right lower leg: No edema.      Left lower leg: No edema.      Comments: Patient with some mild tenderness along dorsum of right great toe.  There is no redness or swelling.  +2 dorsalis pedis and posterior tibialis pulses.  Normal range of motion of toe.   Neurological:      Mental Status: He is alert.         Pertinent Laboratory/Diagnostic Studies:  Lab Results   Component Value Date    BUN 17 08/07/2023    CREATININE  "1.38 (H) 08/07/2023    CALCIUM 9.0 08/07/2023    K 4.2 08/07/2023    CO2 26 08/07/2023     (H) 08/07/2023     Lab Results   Component Value Date    ALT 31 08/07/2023    AST 13 08/07/2023    ALKPHOS 61 08/07/2023       Lab Results   Component Value Date    WBC 7.17 07/10/2024    HGB 13.4 07/10/2024    HCT 41.7 07/10/2024    MCV 92 07/10/2024     07/10/2024       No results found for: \"TSH\"    No results found for: \"CHOL\"  Lab Results   Component Value Date    TRIG 177 (H) 08/07/2023     Lab Results   Component Value Date    HDL 44 08/07/2023     Lab Results   Component Value Date    LDLCALC 156 (H) 08/07/2023     No results found for: \"HGBA1C\"    Results for orders placed or performed in visit on 12/23/24   [facility] Molecular Screen    Specimen: Arm, Right; Blood   Result Value Ref Range    LUX SYNDROME DNA ANALYSIS Not Detected     HEREDITARY BREAST & OVARIAN CANCER DNA ANALYSIS Not Detected     FAMILIAL HYPERCHOLESTEROLEMIA DNA ANALYSIS Not Detected        Orders Placed This Encounter   Procedures    XR foot 3+ vw right       ALLERGIES:  No Known Allergies    Current Medications     Current Outpatient Medications   Medication Sig Dispense Refill    Clomid 50 MG tablet TAKE 1 TABLET (50 MG TOTAL) BY MOUTH EVERY OTHER DAY 30 tablet 6    [facility] Olopatadine HCl 0.1 % ophthalmic solution INSTILL 1 DROP INTO BOTH EYES TWICE A DAY 15 mL 1    predniSONE 20 mg tablet 2 tabs X 3 days, 1 tab X 3 days, 1/2 tab X 4 days 11 tablet 0    sildenafil (VIAGRA) 100 mg tablet Take 1 tablet (100 mg total) by mouth daily as needed for erectile dysfunction 10 tablet 3    tadalafil (CIALIS) 20 MG tablet Take 1 tablet (20 mg total) by mouth daily as needed for erectile dysfunction 10 tablet 3    valACYclovir (VALTREX) 500 mg tablet Take 1 tablet (500 mg total) by mouth 2 (two) times a day as needed (as needed) for up to 5 days 10 tablet 5     No current facility-administered medications for this visit.         Health Maintenance "     Health Maintenance   Topic Date Due    Hepatitis C Screening  Never done    HIV Screening  Never done    DTaP,Tdap,and Td Vaccines (1 - Tdap) Never done    Zoster Vaccine (1 of 2) Never done    Colorectal Cancer Screening  03/22/2021    PT PLAN OF CARE  10/14/2023    Annual Physical  08/03/2024    Influenza Vaccine (1) 09/01/2024    COVID-19 Vaccine (2 - 2024-25 season) 09/01/2024    Depression Screening  03/14/2026    RSV Vaccine for Pregnant Patients and Patients Age 60+ Years (1 - 1-dose 75+ series) 01/14/2039    Meningococcal B Vaccine  Aged Out    RSV Vaccine age 0-20 Months  Aged Out    Pneumococcal Vaccine: Pediatrics (0 to 5 Years) and At-Risk Patients (6 to 64 Years)  Aged Out    HIB Vaccine  Aged Out    IPV Vaccine  Aged Out    Hepatitis A Vaccine  Aged Out    Meningococcal ACWY Vaccine  Aged Out    HPV Vaccine  Aged Out     Immunization History   Administered Date(s) Administered    COVID-19 J&J (Flashstock) vaccine 0.5 mL 08/26/2021    Influenza Injectable, MDCK, Preservative Free, Quadrivalent, 0.5 mL 10/21/2018       Ramón Aceves MD

## 2025-03-17 ENCOUNTER — TELEPHONE (OUTPATIENT)
Age: 60
End: 2025-03-17

## 2025-03-17 LAB
ESTROGEN SERPL-MCNC: 161 PG/ML (ref 56–213)
TESTOST FREE SERPL-MCNC: 17.4 PG/ML (ref 6.6–18.1)
TESTOST SERPL-MCNC: 690 NG/DL (ref 264–916)

## 2025-03-17 NOTE — TELEPHONE ENCOUNTER
"Pt under care of:  Mitchel  Office Location: [facility]    Last Seen (include Follow Up recommendations of last visit- see Office Visit - Instructions): 7/16/24 \"Follow-up in person in 6 months with labs prior to visit.\"    Pt calling due to: Schedule 6 mo f/u    Active Symptoms?  Explain:    Pt can be reached at: [phone]    Appointment Details  Date:  3/20  Time:    1 PM  Location:   [facility]  Provider:  Penny ([facility] anything [facility])    Does the appointment need further review? N.  "

## 2025-04-09 ENCOUNTER — HOSPITAL ENCOUNTER (OUTPATIENT)
Dept: RADIOLOGY | Facility: HOSPITAL | Age: 60
Discharge: HOME | End: 2025-04-09
Payer: COMMERCIAL

## 2025-04-09 ENCOUNTER — TELEPHONE (OUTPATIENT)
Dept: UROLOGY | Facility: CLINIC | Age: 60
End: 2025-04-09

## 2025-04-09 ENCOUNTER — RESULTS FOLLOW-UP (OUTPATIENT)
Dept: FAMILY MEDICINE CLINIC | Facility: CLINIC | Age: 60
End: 2025-04-09

## 2025-04-09 ENCOUNTER — OFFICE VISIT (OUTPATIENT)
Dept: UROLOGY | Facility: CLINIC | Age: 60
End: 2025-04-09
Payer: COMMERCIAL

## 2025-04-09 VITALS
DIASTOLIC BLOOD PRESSURE: 82 MMHG | SYSTOLIC BLOOD PRESSURE: 124 MMHG | BODY MASS INDEX: 38.92 KG/M2 | HEART RATE: 73 BPM | HEIGHT: 67 IN | OXYGEN SATURATION: 96 % | WEIGHT: 248 LBS

## 2025-04-09 DIAGNOSIS — E29.1 MALE HYPOGONADISM: Primary | ICD-10-CM

## 2025-04-09 DIAGNOSIS — M79.671 RIGHT FOOT PAIN: ICD-10-CM

## 2025-04-09 DIAGNOSIS — R97.20 ELEVATED PSA: ICD-10-CM

## 2025-04-09 PROCEDURE — 73630 X-RAY EXAM OF FOOT: CPT

## 2025-04-09 PROCEDURE — 99213 OFFICE O/P EST LOW 20 MIN: CPT | Performed by: PHYSICIAN ASSISTANT

## 2025-04-09 NOTE — TELEPHONE ENCOUNTER
Sridhar Emerson,    Good day,    Can you please kindly switch appointment scheduled from URG to Tele Phone Call scheduled for 10/9/2025 @1:00 pm with [place]. I will then call patient to confirm appointment.      Nishant Notes;  Return for vitual in 6 months psa and labs prior.

## 2025-04-09 NOTE — PROGRESS NOTES
UROLOGY PROGRESS NOTE   Patient Identifiers: Franco Hogan (MRN [id number])  Date of Service: 4/9/2025    Subjective:   60-year-old man with history of erectile dysfunction and testosterone deficiency.  He is on Clomid 50 mg Monday Wednesday Friday.  He has good response testosterone 690.  PSA 3.17.  H&H 13.6 and 41.2.  He continues to have refractory erectile dysfunction.  We talked about Cialis and Viagra again.  I also suggested he have a sleep study.    Reason for visit: Testosterone deficiency follow-up    Objective:     VITALS:    Vitals:    04/09/25 1022   BP: 124/82   Pulse: 73   SpO2: 96%     AUA SYMPTOM SCORE      Flowsheet Row Most Recent Value   AUA SYMPTOM SCORE    How often have you had a sensation of not emptying your bladder completely after you finished urinating? 0 (P)     How often have you had to urinate again less than two hours after you finished urinating? 1 (P)     How often have you found you stopped and started again several times when you urinate? 0 (P)     How often have you found it difficult to postpone urination? 0 (P)     How often have you had a weak urinary stream? 1 (P)     How often have you had to push or strain to begin urination? 0 (P)     How many times did you most typically get up to urinate from the time you went to bed at night until the time you got up in the morning? 1 (P)     Quality of Life: If you were to spend the rest of your life with your urinary condition just the way it is now, how would you feel about that? 1 (P)     AUA SYMPTOM SCORE 3 (P)                LABS:  Lab Results   Component Value Date    HGB 13.6 03/14/2025    HCT 41.2 03/14/2025    WBC 8.11 03/14/2025     03/14/2025   ]    Lab Results   Component Value Date    K 4.2 08/07/2023     (H) 08/07/2023    CO2 26 08/07/2023    BUN 17 08/07/2023    CREATININE 1.38 (H) 08/07/2023    CALCIUM 9.0 08/07/2023   ]        INPATIENT MEDS:    Current Outpatient Medications:     Clomid 50 MG tablet,  "TAKE 1 TABLET (50 MG TOTAL) BY MOUTH EVERY OTHER DAY, Disp: 30 tablet, Rfl: 6    [facility] Olopatadine HCl 0.1 % ophthalmic solution, INSTILL 1 DROP INTO BOTH EYES TWICE A DAY, Disp: 15 mL, Rfl: 1    sildenafil (VIAGRA) 100 mg tablet, Take 1 tablet (100 mg total) by mouth daily as needed for erectile dysfunction, Disp: 10 tablet, Rfl: 3    tadalafil (CIALIS) 20 MG tablet, Take 1 tablet (20 mg total) by mouth daily as needed for erectile dysfunction, Disp: 10 tablet, Rfl: 3    valACYclovir (VALTREX) 500 mg tablet, Take 1 tablet (500 mg total) by mouth 2 (two) times a day as needed (as needed) for up to 5 days, Disp: 10 tablet, Rfl: 5      Physical Exam:   /82 (BP Location: Left arm, Patient Position: Sitting, Cuff Size: Large)   Pulse 73   Ht 5' 7\" (1.702 m) Comment: verbal  Wt 112 kg (248 lb) Comment: clothes and shoes  SpO2 96%   BMI 38.84 kg/m²   GEN: no acute distress    RESP: breathing comfortably with no accessory muscle use    ABD: soft, non-tender, non-distended   INCISION:    EXT: no significant peripheral edema   (Male): Penis circumcised, phallus normal, meatus patent.  Testicles descended into scrotum bilaterally without masses nor tenderness.  No inguinal hernias bilaterally.  ROSELYN: Prostate is enlarged at 35 grams. The prostate is not boggy. The prostate is not tender.  No nodules noted      RADIOLOGY:   none     Assessment:   #1.  Testosterone deficiency  #2.  Erectile dysfunction  #3.  Elevated PSA    Plan:   -Virtual appointment in 6 months with labs and PSA prior to visit  - We discussed combination Cialis and sildenafil  -  -          "

## 2025-04-10 ENCOUNTER — TELEPHONE (OUTPATIENT)
Dept: UROLOGY | Facility: CLINIC | Age: 60
End: 2025-04-10

## 2025-04-10 DIAGNOSIS — M79.671 RIGHT FOOT PAIN: Primary | ICD-10-CM

## 2025-04-10 NOTE — TELEPHONE ENCOUNTER
Called patient on 4/9/2025 to confirm appointment scheduled for 10/9 @ 1:00PM and left a voicemail message.

## 2025-04-10 NOTE — TELEPHONE ENCOUNTER
2nd attempt, sent patient a message through his [employer] informing patient to give the office a call back at [phone] to confirm appointment scheduled for 10/9/2025 at 1:00 pm at Urology.

## 2025-04-11 ENCOUNTER — TELEPHONE (OUTPATIENT)
Dept: UROLOGY | Facility: CLINIC | Age: 60
End: 2025-04-11

## (undated) RX ORDER — PREDNISOLONE ACETATE 10 MG/ML
1 SUSPENSION/ DROPS OPHTHALMIC
Start: 2023-08-17